# Patient Record
Sex: FEMALE | Race: WHITE
[De-identification: names, ages, dates, MRNs, and addresses within clinical notes are randomized per-mention and may not be internally consistent; named-entity substitution may affect disease eponyms.]

---

## 2020-11-17 ENCOUNTER — HOSPITAL ENCOUNTER (EMERGENCY)
Dept: HOSPITAL 75 - ER | Age: 25
Discharge: HOME | End: 2020-11-17
Payer: SELF-PAY

## 2020-11-17 VITALS — BODY MASS INDEX: 27.3 KG/M2 | WEIGHT: 130.07 LBS | HEIGHT: 57.87 IN

## 2020-11-17 VITALS — DIASTOLIC BLOOD PRESSURE: 95 MMHG | SYSTOLIC BLOOD PRESSURE: 142 MMHG

## 2020-11-17 DIAGNOSIS — Z20.828: ICD-10-CM

## 2020-11-17 DIAGNOSIS — J06.9: Primary | ICD-10-CM

## 2020-11-17 PROCEDURE — 87804 INFLUENZA ASSAY W/OPTIC: CPT

## 2020-11-17 PROCEDURE — 87635 SARS-COV-2 COVID-19 AMP PRB: CPT

## 2020-11-17 NOTE — ED COUGH/URI
General


Stated Complaint:  SOB,HA, LOST OF SMELL


Source:  patient


Exam Limitations:  no limitations





History of Present Illness


Date Seen by Provider:  Nov 17, 2020


Time Seen by Provider:  08:21


Initial Comments


25-year-old female presents with headache, congestion, body aches, chills, 

cough. Patient reports that her smell is not as good as it normally is but she 

is also congested. She does not have any loss of taste. Patient admits to being 

a smoker so she has a chronic cough. She has some mild feeling of shortness of 

breath. She does not have any chest pain.





Allergies and Home Medications


Home Medications


No Active Prescriptions or Reported Meds





Patient Home Medication List


Home Medication List Reviewed:  Yes





Review of Systems


Review of Systems


Constitutional:  chills, malaise


EENTM:  see HPI; No throat pain


Respiratory:  cough, short of breath; No wheezing


Cardiovascular:  No chest pain, No palpitations


Gastrointestinal:  No constipation, No diarrhea; nausea; No vomiting


Genitourinary:  no symptoms reported


Musculoskeletal:  see HPI


Skin:  no symptoms reported


Psychiatric/Neurological:  No Symptoms Reported


Hematologic/Lymphatic:  No Symptoms Reported


Immunological/Allergic:  no symptoms reported





Past Medical-Social-Family Hx


Past Med/Social Hx:  Reviewed Nursing Past Med/Soc Hx





Physical Exam





Vital Signs - First Documented








 11/17/20





 08:22


 


Temp 35.0


 


Pulse 107


 


Resp 20


 


B/P (MAP) 142/95 (111)


 


Pulse Ox 100





Capillary Refill :


Height: '"


Weight: lbs. oz. kg;  BMI


Method:


General Appearance:  no apparent distress


Eyes:  Bilateral Eye Normal Inspection, Bilateral Eye PERRL


HEENT:  pharynx normal, other (mucous membranes moist)


Neck:  full range of motion, supple


Respiratory:  chest non-tender, lungs clear


Cardiovascular:  normal peripheral pulses, regular rate, rhythm, no edema


Gastrointestinal:  soft


Extremities:  normal range of motion, non-tender, normal inspection


Neurologic/Psychiatric:  alert, normal mood/affect, oriented x 3


Skin:  normal color, warm/dry





Progress/Results/Core Measures


Suspected Sepsis


SIRS


Temperature: 


Pulse:  


Respiratory Rate: 


 


Blood Pressure  / 


Mean:





Results/Orders


Lab Results





Laboratory Tests








Test


 11/17/20


08:26 Range/Units


 








Micro Results





Microbiology


11/17/20 Influenza Types A,B Antigen ([facility]) - Final, Complete


           





My Orders





Orders - YANE TOTH DO


Influenza A And B Antigens (11/17/20 08:27)


Coronavirus Sars-Cov-2 So 2019 (11/17/20 08:27)





Vital Signs/I&O











 11/17/20





 08:22


 


Temp 35.0


 


Pulse 107


 


Resp 20


 


B/P (MAP) 142/95 (111)


 


Pulse Ox 100





Capillary Refill :


Progress Note :  


   Time:  08:31


Progress Note


Patient wants limited evaluation because she does not have insurance. At this 

time we will check her for COVID and flu. I do think that is reasonable not to 

do labs or an x-ray due to physical exam being benign along with stable vital 

signs. Discussed with her that labs and x-ray likely would not change 

recommendations.





Departure


Impression





   Primary Impression:  


   Upper respiratory infection


   Qualified Codes:  J06.9 - Acute upper respiratory infection, unspecified


Disposition:  01 HOME, SELF-CARE


Condition:  Improved





Departure-Patient Inst.


Referrals:  


NO,LOCAL PHYSICIAN (PCP/Family)


Primary Care Physician


Patient Instructions:  Coronavirus Disease 2019 (COVID-19) (DC), Viral Upper 

Respiratory Infection, Adult (DC)





Add. Discharge Instructions:  


We are awaiting a COVID 19 test, hospital will call you with a positive result.


Please start vitamin C 500 mg twice a day, zinc 100 mg daily, vitamin D3 300 

units daily, Pepcid 20 mg daily, aspirin 81 mg daily


Drink plenty of fluids, and get plenty of rest


Scripts


No Active Prescriptions or Reported Meds











YANE TOTH DO               Nov 17, 2020 08:21